# Patient Record
Sex: FEMALE | Race: WHITE | NOT HISPANIC OR LATINO | URBAN - METROPOLITAN AREA
[De-identification: names, ages, dates, MRNs, and addresses within clinical notes are randomized per-mention and may not be internally consistent; named-entity substitution may affect disease eponyms.]

---

## 2018-04-03 ENCOUNTER — AMBULATORY - HEALTHEAST (OUTPATIENT)
Dept: MULTI SPECIALTY CLINIC | Facility: CLINIC | Age: 28
End: 2018-04-03

## 2018-04-03 LAB — PAP SMEAR - HIM PATIENT REPORTED: NORMAL

## 2018-11-21 ENCOUNTER — COMMUNICATION - HEALTHEAST (OUTPATIENT)
Dept: TELEHEALTH | Facility: CLINIC | Age: 28
End: 2018-11-21

## 2018-11-21 ENCOUNTER — OFFICE VISIT - HEALTHEAST (OUTPATIENT)
Dept: INTERNAL MEDICINE | Facility: CLINIC | Age: 28
End: 2018-11-21

## 2018-11-21 DIAGNOSIS — E61.1 IRON DEFICIENCY: ICD-10-CM

## 2018-11-21 DIAGNOSIS — G47.11 IDIOPATHIC HYPERSOMNIA: ICD-10-CM

## 2018-11-21 DIAGNOSIS — R76.8 ANTI-TPO ANTIBODIES PRESENT: ICD-10-CM

## 2018-11-21 DIAGNOSIS — F41.9 ANXIETY: ICD-10-CM

## 2018-11-21 LAB
ERYTHROCYTE [DISTWIDTH] IN BLOOD BY AUTOMATED COUNT: 13.2 % (ref 11–14.5)
HCT VFR BLD AUTO: 41.1 % (ref 35–47)
HGB BLD-MCNC: 13.5 G/DL (ref 12–16)
MCH RBC QN AUTO: 28.8 PG (ref 27–34)
MCHC RBC AUTO-ENTMCNC: 32.8 G/DL (ref 32–36)
MCV RBC AUTO: 88 FL (ref 80–100)
PLATELET # BLD AUTO: 328 THOU/UL (ref 140–440)
PMV BLD AUTO: 9.7 FL (ref 8.5–12.5)
RBC # BLD AUTO: 4.69 MILL/UL (ref 3.8–5.4)
WBC: 5.3 THOU/UL (ref 4–11)

## 2018-11-21 ASSESSMENT — MIFFLIN-ST. JEOR: SCORE: 1333.23

## 2018-11-23 LAB
FERRITIN SERPL-MCNC: 14 NG/ML (ref 10–130)
TSH SERPL DL<=0.005 MIU/L-ACNC: 3.04 UIU/ML (ref 0.3–5)

## 2018-12-16 ENCOUNTER — COMMUNICATION - HEALTHEAST (OUTPATIENT)
Dept: INTERNAL MEDICINE | Facility: CLINIC | Age: 28
End: 2018-12-16

## 2018-12-26 ENCOUNTER — COMMUNICATION - HEALTHEAST (OUTPATIENT)
Dept: INTERNAL MEDICINE | Facility: CLINIC | Age: 28
End: 2018-12-26

## 2019-01-14 ENCOUNTER — COMMUNICATION - HEALTHEAST (OUTPATIENT)
Dept: INTERNAL MEDICINE | Facility: CLINIC | Age: 29
End: 2019-01-14

## 2019-01-22 ENCOUNTER — COMMUNICATION - HEALTHEAST (OUTPATIENT)
Dept: INTERNAL MEDICINE | Facility: CLINIC | Age: 29
End: 2019-01-22

## 2019-02-13 ENCOUNTER — COMMUNICATION - HEALTHEAST (OUTPATIENT)
Dept: INTERNAL MEDICINE | Facility: CLINIC | Age: 29
End: 2019-02-13

## 2019-03-19 ENCOUNTER — COMMUNICATION - HEALTHEAST (OUTPATIENT)
Dept: INTERNAL MEDICINE | Facility: CLINIC | Age: 29
End: 2019-03-19

## 2019-03-19 DIAGNOSIS — G47.11 IDIOPATHIC HYPERSOMNIA: ICD-10-CM

## 2019-04-24 ENCOUNTER — COMMUNICATION - HEALTHEAST (OUTPATIENT)
Dept: INTERNAL MEDICINE | Facility: CLINIC | Age: 29
End: 2019-04-24

## 2019-04-24 DIAGNOSIS — G47.11 IDIOPATHIC HYPERSOMNIA: ICD-10-CM

## 2019-06-01 ENCOUNTER — COMMUNICATION - HEALTHEAST (OUTPATIENT)
Dept: INTERNAL MEDICINE | Facility: CLINIC | Age: 29
End: 2019-06-01

## 2019-06-01 DIAGNOSIS — G47.11 IDIOPATHIC HYPERSOMNIA: ICD-10-CM

## 2019-06-19 ENCOUNTER — RECORDS - HEALTHEAST (OUTPATIENT)
Dept: ADMINISTRATIVE | Facility: OTHER | Age: 29
End: 2019-06-19

## 2019-07-09 ENCOUNTER — COMMUNICATION - HEALTHEAST (OUTPATIENT)
Dept: INTERNAL MEDICINE | Facility: CLINIC | Age: 29
End: 2019-07-09

## 2019-07-09 DIAGNOSIS — G47.11 IDIOPATHIC HYPERSOMNIA: ICD-10-CM

## 2019-08-08 ENCOUNTER — COMMUNICATION - HEALTHEAST (OUTPATIENT)
Dept: INTERNAL MEDICINE | Facility: CLINIC | Age: 29
End: 2019-08-08

## 2019-08-08 DIAGNOSIS — G47.11 IDIOPATHIC HYPERSOMNIA: ICD-10-CM

## 2019-08-14 ENCOUNTER — OFFICE VISIT - HEALTHEAST (OUTPATIENT)
Dept: INTERNAL MEDICINE | Facility: CLINIC | Age: 29
End: 2019-08-14

## 2019-08-14 DIAGNOSIS — M77.9 BONE SPUR: ICD-10-CM

## 2019-08-14 DIAGNOSIS — Z01.818 PREOPERATIVE EXAMINATION: ICD-10-CM

## 2019-08-14 DIAGNOSIS — M79.672 LEFT FOOT PAIN: ICD-10-CM

## 2019-08-14 DIAGNOSIS — G47.11 IDIOPATHIC HYPERSOMNIA: ICD-10-CM

## 2019-08-14 ASSESSMENT — MIFFLIN-ST. JEOR: SCORE: 1328.7

## 2019-08-23 ENCOUNTER — RECORDS - HEALTHEAST (OUTPATIENT)
Dept: ADMINISTRATIVE | Facility: OTHER | Age: 29
End: 2019-08-23

## 2019-08-28 ENCOUNTER — RECORDS - HEALTHEAST (OUTPATIENT)
Dept: ADMINISTRATIVE | Facility: OTHER | Age: 29
End: 2019-08-28

## 2019-08-28 LAB
LAB AP CHARGES (HE HISTORICAL CONVERSION): NORMAL
PATH REPORT.COMMENTS IMP SPEC: NORMAL
PATH REPORT.COMMENTS IMP SPEC: NORMAL
PATH REPORT.FINAL DX SPEC: NORMAL
PATH REPORT.GROSS SPEC: NORMAL
PATH REPORT.MICROSCOPIC SPEC OTHER STN: NORMAL
PATH REPORT.RELEVANT HX SPEC: NORMAL
RESULT FLAG (HE HISTORICAL CONVERSION): NORMAL

## 2019-09-10 ENCOUNTER — COMMUNICATION - HEALTHEAST (OUTPATIENT)
Dept: INTERNAL MEDICINE | Facility: CLINIC | Age: 29
End: 2019-09-10

## 2019-09-11 ENCOUNTER — COMMUNICATION - HEALTHEAST (OUTPATIENT)
Dept: INTERNAL MEDICINE | Facility: CLINIC | Age: 29
End: 2019-09-11

## 2019-09-11 DIAGNOSIS — G47.11 IDIOPATHIC HYPERSOMNIA: ICD-10-CM

## 2019-10-20 ENCOUNTER — COMMUNICATION - HEALTHEAST (OUTPATIENT)
Dept: INTERNAL MEDICINE | Facility: CLINIC | Age: 29
End: 2019-10-20

## 2019-10-20 DIAGNOSIS — G47.11 IDIOPATHIC HYPERSOMNIA: ICD-10-CM

## 2019-11-26 ENCOUNTER — COMMUNICATION - HEALTHEAST (OUTPATIENT)
Dept: INTERNAL MEDICINE | Facility: CLINIC | Age: 29
End: 2019-11-26

## 2019-11-26 DIAGNOSIS — Z00.00 ROUTINE GENERAL MEDICAL EXAMINATION AT A HEALTH CARE FACILITY: ICD-10-CM

## 2019-11-29 RX ORDER — DROSPIRENONE AND ETHINYL ESTRADIOL 0.03MG-3MG
KIT ORAL
Qty: 84 TABLET | Refills: 3 | Status: SHIPPED | OUTPATIENT
Start: 2019-11-29

## 2019-12-01 ENCOUNTER — COMMUNICATION - HEALTHEAST (OUTPATIENT)
Dept: INTERNAL MEDICINE | Facility: CLINIC | Age: 29
End: 2019-12-01

## 2019-12-01 DIAGNOSIS — G47.11 IDIOPATHIC HYPERSOMNIA: ICD-10-CM

## 2020-01-06 ENCOUNTER — COMMUNICATION - HEALTHEAST (OUTPATIENT)
Dept: INTERNAL MEDICINE | Facility: CLINIC | Age: 30
End: 2020-01-06

## 2020-01-06 DIAGNOSIS — G47.11 IDIOPATHIC HYPERSOMNIA: ICD-10-CM

## 2020-01-08 ENCOUNTER — COMMUNICATION - HEALTHEAST (OUTPATIENT)
Dept: INTERNAL MEDICINE | Facility: CLINIC | Age: 30
End: 2020-01-08

## 2020-01-08 DIAGNOSIS — G47.11 IDIOPATHIC HYPERSOMNIA: ICD-10-CM

## 2020-02-11 ENCOUNTER — COMMUNICATION - HEALTHEAST (OUTPATIENT)
Dept: INTERNAL MEDICINE | Facility: CLINIC | Age: 30
End: 2020-02-11

## 2020-02-11 DIAGNOSIS — G47.11 IDIOPATHIC HYPERSOMNIA: ICD-10-CM

## 2020-02-12 ENCOUNTER — COMMUNICATION - HEALTHEAST (OUTPATIENT)
Dept: INTERNAL MEDICINE | Facility: CLINIC | Age: 30
End: 2020-02-12

## 2020-02-12 DIAGNOSIS — G47.11 IDIOPATHIC HYPERSOMNIA: ICD-10-CM

## 2020-02-14 ENCOUNTER — COMMUNICATION - HEALTHEAST (OUTPATIENT)
Dept: INTERNAL MEDICINE | Facility: CLINIC | Age: 30
End: 2020-02-14

## 2020-02-14 DIAGNOSIS — G47.11 IDIOPATHIC HYPERSOMNIA: ICD-10-CM

## 2020-02-19 ENCOUNTER — COMMUNICATION - HEALTHEAST (OUTPATIENT)
Dept: INTERNAL MEDICINE | Facility: CLINIC | Age: 30
End: 2020-02-19

## 2020-02-19 DIAGNOSIS — F41.9 ANXIETY: ICD-10-CM

## 2020-02-24 RX ORDER — VENLAFAXINE HYDROCHLORIDE 150 MG/1
CAPSULE, EXTENDED RELEASE ORAL
Qty: 90 CAPSULE | Refills: 3 | Status: SHIPPED | OUTPATIENT
Start: 2020-02-24

## 2020-03-20 ENCOUNTER — COMMUNICATION - HEALTHEAST (OUTPATIENT)
Dept: INTERNAL MEDICINE | Facility: CLINIC | Age: 30
End: 2020-03-20

## 2020-03-20 DIAGNOSIS — G47.11 IDIOPATHIC HYPERSOMNIA: ICD-10-CM

## 2020-03-21 ENCOUNTER — COMMUNICATION - HEALTHEAST (OUTPATIENT)
Dept: INTERNAL MEDICINE | Facility: CLINIC | Age: 30
End: 2020-03-21

## 2020-03-21 DIAGNOSIS — G47.11 IDIOPATHIC HYPERSOMNIA: ICD-10-CM

## 2020-03-23 RX ORDER — DEXTROAMPHETAMINE SACCHARATE, AMPHETAMINE ASPARTATE, DEXTROAMPHETAMINE SULFATE AND AMPHETAMINE SULFATE 1.25; 1.25; 1.25; 1.25 MG/1; MG/1; MG/1; MG/1
TABLET ORAL
Qty: 90 TABLET | Refills: 0 | Status: SHIPPED | OUTPATIENT
Start: 2020-03-23

## 2020-04-20 ENCOUNTER — COMMUNICATION - HEALTHEAST (OUTPATIENT)
Dept: INTERNAL MEDICINE | Facility: CLINIC | Age: 30
End: 2020-04-20

## 2020-04-20 DIAGNOSIS — G47.11 IDIOPATHIC HYPERSOMNIA: ICD-10-CM

## 2020-05-23 ENCOUNTER — COMMUNICATION - HEALTHEAST (OUTPATIENT)
Dept: INTERNAL MEDICINE | Facility: CLINIC | Age: 30
End: 2020-05-23

## 2020-05-23 DIAGNOSIS — G47.11 IDIOPATHIC HYPERSOMNIA: ICD-10-CM

## 2020-05-26 RX ORDER — DEXTROAMPHETAMINE SACCHARATE, AMPHETAMINE ASPARTATE, DEXTROAMPHETAMINE SULFATE AND AMPHETAMINE SULFATE 1.25; 1.25; 1.25; 1.25 MG/1; MG/1; MG/1; MG/1
TABLET ORAL
Qty: 90 TABLET | Refills: 0 | Status: SHIPPED | OUTPATIENT
Start: 2020-05-26

## 2020-07-06 ENCOUNTER — COMMUNICATION - HEALTHEAST (OUTPATIENT)
Dept: INTERNAL MEDICINE | Facility: CLINIC | Age: 30
End: 2020-07-06

## 2021-05-28 NOTE — TELEPHONE ENCOUNTER
Prescription Monitoring Program activity reviewed with no discrepancies noted.      Last fill per : 03/21/2019  Quantity/days supply: 90    Controlled Substance Agreement on file: Yes  Date: 11/21/2018    Last office visit with provider:  11/21/2018 Guicho Kilgore MD    Please advise.

## 2021-05-28 NOTE — TELEPHONE ENCOUNTER
Controlled Substance Refill Request  Medication:   Requested Prescriptions     Pending Prescriptions Disp Refills     dextroamphetamine-amphetamine (ADDERALL) 5 mg Tab tablet 90 tablet 0     Sig: Adderall 5 mg tablet  2 tablets in am and 1 tablet in pm     Date Last Fill: 3/21/19  Pharmacy: [id number]   Submit electronically to pharmacy  Controlled Substance Agreement on File:   Encounter-Level CSA Scan Date:    There are no encounter-level csa scan date.       Last office visit: Last office visit pertaining to requested medication was 11/21/18.

## 2021-05-29 NOTE — TELEPHONE ENCOUNTER
Controlled Substance Refill Request  Medication:   Requested Prescriptions     Pending Prescriptions Disp Refills     dextroamphetamine-amphetamine (ADDERALL) 5 mg Tab tablet 90 tablet 0     Sig: Adderall 5 mg tablet  2 tablets in am and 1 tablet in pm     Date Last Fill: 4/26/19  [facility]    Submit electronically to pharmacy  Controlled Substance Agreement on File:   Encounter-Level CSA Scan Date:    There are no encounter-level csa scan date.       Last office visit: Last office visit pertaining to requested medication was 11/21/18 .

## 2021-05-30 NOTE — TELEPHONE ENCOUNTER
Controlled Substance Refill Request  Medication:   Requested Prescriptions     Pending Prescriptions Disp Refills     dextroamphetamine-amphetamine (ADDERALL) 5 mg Tab tablet 90 tablet 0     Sig: Adderall 5 mg tablet  2 tablets in am and 1 tablet in pm     Date Last Fill: 6/4/19  Pharmacy: [id number]   Submit electronically to pharmacy  Controlled Substance Agreement on File:   Encounter-Level CSA Scan Date:    There are no encounter-level csa scan date.       Last office visit: Last office visit pertaining to requested medication was 11/21/18.

## 2021-05-31 NOTE — PROGRESS NOTES
Preoperative Consultation   Alexa Craig   28 y.o.  female    Date of visit: 8/14/2019  Physician: Guicho Kilgore MD    This is a preoperative consultation requested by Dr. Mosley in preparation for left foot surgery on 8/23/19 at [facility].       Assessment and Plan   Alexa Craig was seen in preoperative consultation in preparation for left foot sugery.  This is a low risk surgery and the patient has no increased risk for major cardiac complications.  Please note she will hold ibuprofen 1 week prior to surgery.  Her last menstrual period started on 8/7/2019 and urine pregnancy test could be performed the morning of surgery.    1. Preoperative examination    2. Left foot pain    3. Bone spur    4. Idiopathic hypersomnia         Patient Profile   Social History     Social History Narrative    Lives with her Luigi parmarian.  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          Past Medical History   Patient Active Problem List   Diagnosis     Idiopathic hypersomnia       Past Surgical History  She has a past surgical history that includes Toe Surgery (Right).     History of Present Illness   This 28 y.o. old woman comes in for preoperative evaluation.  She will be having debridement of her left foot for chronic left foot pain and edema.  She has tried conservative measures which have not been helpful.  She had similar symptoms of her right foot and had surgery which was helpful.  She has pain with daily activities and has not been able to do much cardiovascular exercise.    Recent antiplatelet use: yes ibuprofen, will stop 1 week prior to surgery  Personal or family history of bleeding or clotting disorders: no  Steroid use in the past year: no  Personal or family history of difficulty with anesthesia: no  Current cardiopulmonary symptoms: no  Last Menstrual Period: 8/7/19    Review of Systems: A comprehensive review of systems was negative except as noted.     Medications and Allergies  "  Current Outpatient Medications   Medication Sig Dispense Refill     dextroamphetamine-amphetamine (ADDERALL) 5 mg Tab tablet Adderall 5 mg tablet  2 tablets in am and 1 tablet in pm 90 tablet 0     drospirenone-ethinyl estradiol (AIME) 3-0.03 mg per tablet Take 1 tablet by mouth daily. 3 Package 3     venlafaxine (EFFEXOR-XR) 150 MG 24 hr capsule Take 1 capsule (150 mg total) by mouth daily. 90 capsule 3     No current facility-administered medications for this visit.      No Known Allergies     Family and Social History   Family History   Problem Relation Age of Onset     Osteoporosis Mother      Hyperlipidemia Father      No Medical Problems Sister      No Medical Problems Sister      No Medical Problems Sister         Social History     Tobacco Use     Smoking status: Never Smoker     Smokeless tobacco: Never Used   Substance Use Topics     Alcohol use: Yes     Frequency: 2-4 times a month     Drinks per session: 3 or 4     Drug use: No        Physical Exam   General Appearance:   No acute distress    /68 (Patient Site: Left Arm, Patient Position: Sitting, Cuff Size: Adult Regular)   Pulse 79   Ht 5' 7\" (1.702 m)   Wt 127 lb (57.6 kg)   SpO2 99%   BMI 19.89 kg/m      EYES: Eyelids, conjunctiva, and sclera were normal. Pupils were normal. Cornea, iris, and lens were normal bilaterally.  HEAD, EARS, NOSE, MOUTH, AND THROAT: Head and face were normal. Hearing was normal to voice and the ears were normal to external exam. Nose appearance was normal and there was no discharge. Oropharynx was normal.  NECK: Neck appearance was normal. There were no neck masses and the thyroid was not enlarged.  RESPIRATORY: Breathing pattern was normal and the chest moved symmetrically.  Percussion/auscultatory percussion was normal.  Lung sounds were normal and there were no abnormal sounds.  CARDIOVASCULAR: Heart rate and rhythm were normal.  S1 and S2 were normal and there were no extra sounds or murmurs. Peripheral " pulses in arms and legs were normal.  Jugular venous pressure was normal.  There was no peripheral edema.  GASTROINTESTINAL: The abdomen was normal in contour.  Bowel sounds were present.  Percussion detected no organ enlargement or tenderness.  Palpation detected no tenderness, mass, or enlarged organs.   MUSCULOSKELETAL: Skeletal configuration was normal and muscle mass was normal for age. Joint appearance was overall normal.  LYMPHATIC: There were no enlarged nodes.  SKIN/HAIR/NAILS: Skin color was normal.  There were no skin lesions.  Hair and nails were normal.  NEUROLOGIC: The patient was alert and oriented to person, place, time, and circumstance. Speech was normal. Cranial nerves were normal. Motor strength was normal for age. The patient was normally coordinated.  PSYCHIATRIC:  Mood and affect were normal and the patient had normal recent and remote memory. The patient's judgment and insight were normal.       Additional Tests   Laboratory: Not indicated    Radiology: Reviewed MRI and surgical consultative notes    Electrocardiogram: Not indicated       Guicho Kilgore MD  Internal Medicine  Contact me at [phone]

## 2021-05-31 NOTE — TELEPHONE ENCOUNTER
Controlled Substance Refill Request  Medication:   Requested Prescriptions     Pending Prescriptions Disp Refills     dextroamphetamine-amphetamine (ADDERALL) 5 mg Tab tablet 90 tablet 0     Sig: Adderall 5 mg tablet  2 tablets in am and 1 tablet in pm     Date Last Fill: 7/10/19  [id number]   Submit electronically to pharmacy  Controlled Substance Agreement on File:   Encounter-Level CSA Scan Date:    There are no encounter-level csa scan date.       Last office visit: 11/21/2018 Guicho Kilgore MD

## 2021-06-02 VITALS — WEIGHT: 128 LBS | BODY MASS INDEX: 20.09 KG/M2 | HEIGHT: 67 IN

## 2021-06-02 NOTE — TELEPHONE ENCOUNTER
Prescription Monitoring Program activity reviewed with no discrepancies noted.      Last fill per : 9/11/19  Quantity/days supply: 90 tablets for 30 days    Controlled Substance Agreement on file: Yes  Date: 11/28/18    Last office visit with provider:  11/21/2018 Guicho Kilgore MD    Please advise.

## 2021-06-02 NOTE — TELEPHONE ENCOUNTER
Controlled Substance Refill Request  Medication:   Requested Prescriptions     Pending Prescriptions Disp Refills     dextroamphetamine-amphetamine (ADDERALL) 5 mg Tab tablet 90 tablet 0     Sig: Adderall 5 mg tablet  2 tablets in am and 1 tablet in pm     Date Last Fill: 9/11/19  [facility]   Submit electronically to pharmacy  Controlled Substance Agreement on File:   Encounter-Level CSA Scan Date:    There are no encounter-level csa scan date.       Last office visit: Last office visit pertaining to requested medication was 8/14/19.

## 2021-06-03 VITALS — HEIGHT: 67 IN | WEIGHT: 127 LBS | BODY MASS INDEX: 19.93 KG/M2

## 2021-06-03 NOTE — TELEPHONE ENCOUNTER
RN cannot approve Refill Request    RN can NOT refill this medication medication not on med list.     Last office visit: 11/21/2018 uGicho Kilgore MD Last Physical: 8/14/2019 Last MTM visit: Visit date not found Last visit same specialty: 11/21/2018 Guicho Kilgore MD.  Next visit within 3 mo: Visit date not found  Next physical within 3 mo: Visit date not found      Vinod Messina, [facility] Triage/Med Refill 11/28/2019    Requested Prescriptions   Pending Prescriptions Disp Refills     GERMAIN 3-0.03 mg per tablet [Pharmacy Med Name: GERMAIN 3-0.03MG TABLETS 28S] 84 tablet 0     Sig: TAKE 1 TABLET BY MOUTH DAILY       Oral Contraceptives Protocol Passed - 11/26/2019  3:50 AM        Passed - Visit with PCP or prescribing provider visit in last 12 months      Last office visit with prescriber/PCP: 11/21/2018 Guicho Kilgore MD OR same dept: Visit date not found OR same specialty: 11/21/2018 Guicho Kilgore MD  Last physical: 8/14/2019 Last MTM visit: Visit date not found   Next visit within 3 mo: Visit date not found  Next physical within 3 mo: Visit date not found  Prescriber OR PCP: Guicho Kilgore MD  Last diagnosis associated with med order: There are no diagnoses linked to this encounter.  If protocol passes may refill for 12 months if within 3 months of last provider visit (or a total of 15 months).

## 2021-06-03 NOTE — TELEPHONE ENCOUNTER
Controlled Substance Refill Request  Medication:   Requested Prescriptions     Pending Prescriptions Disp Refills     dextroamphetamine-amphetamine (ADDERALL) 5 mg Tab tablet 90 tablet 0     Sig: Adderall 5 mg tablet  2 tablets in am and 1 tablet in pm     Date Last Fill: 10/21/2019  Pharmacy: [facility]   Submit electronically to pharmacy  Controlled Substance Agreement on File:   Encounter-Level CSA Scan Date:    There are no encounter-level csa scan date.       Last office visit: 8/14/2019. Last office visit pertaining to requested medication was 11/21/2018 with PCP Dr SVETLANA Kilgore, establishing care.

## 2021-06-03 NOTE — TELEPHONE ENCOUNTER
Prescription Monitoring Program activity reviewed with no discrepancies noted.      Last fill per : 10/21/19  Quantity/days supply: 90 tablets for 30 days    Controlled Substance Agreement on file: No  Date: NA    Last office visit with provider:  8/14/19  Please advise.

## 2021-06-05 NOTE — TELEPHONE ENCOUNTER
Controlled Substance Refill Request  Medication Name:   Requested Prescriptions     Pending Prescriptions Disp Refills     dextroamphetamine-amphetamine (ADDERALL) 5 mg Tab tablet 90 tablet 0     Sig: Adderall 5 mg tablet  2 tablets in am and 1 tablet in pm     Date Last Fill:   dextroamphetamine-amphetamine (ADDERALL) 5 mg Tab tablet 90 tablet 0 12/2/2019  No   Sig: Adderall 5 mg tablet  2 tablets in am and 1 tablet in pm   Sent to pharmacy as: dextroamphetamine-amphetamine 5 mg tablet (ADDERALL)   Earliest Fill Date: 12/2/2019   E-Prescribing Status: Receipt confirmed by pharmacy (12/2/2019  9:17 AM CST)   [facility]ed [facility]  Submit electronically to pharmacy  Controlled Substance Agreement on file:   Encounter-Level CSA Scan Date - 11/21/2018:    Scan on 11/29/2018  6:39 AM        Last office visit:  8/14/19

## 2021-06-05 NOTE — TELEPHONE ENCOUNTER
Controlled Substance Refill Request  Medication Name:   Requested Prescriptions     Pending Prescriptions Disp Refills     dextroamphetamine-amphetamine (ADDERALL) 5 mg Tab tablet 90 tablet 0     Sig: Adderall 5 mg tablet  2 tablets in am and 1 tablet in pm     Date Last Fill:   dextroamphetamine-amphetamine (ADDERALL) 5 mg Tab tablet 90 tablet 0 12/2/2019  No   Sig: Adderall 5 mg tablet  2 tablets in am and 1 tablet in pm   Sent to pharmacy as: dextroamphetamine-amphetamine 5 mg tablet (ADDERALL)   Earliest Fill Date: 12/2/2019   [facility]ed [facility]  Submit electronically to pharmacy  Controlled Substance Agreement on file:   Encounter-Level CSA Scan Date - 11/21/2018:    Scan on 11/29/2018  6:39 AM        Last office visit:  8/14/19

## 2021-06-06 NOTE — TELEPHONE ENCOUNTER
RN cannot approve Refill Request    RN can NOT refill this medication Protocol failed and NO refill given.      Anitra Godwin, [facility] Triage/Med Refill 2/21/2020    Requested Prescriptions   Pending Prescriptions Disp Refills     venlafaxine (EFFEXOR-XR) 150 MG 24 hr capsule [Pharmacy Med Name: VENLAFAXINE ER 150MG CAPSULES] 90 capsule 3     Sig: TAKE 1 CAPSULE(150 MG) BY MOUTH DAILY       Venlafaxine/Desvenlafaxine Refill Protocol Failed - 2/19/2020  3:47 AM        Failed - LFT or AST or ALT in last year     No results found for: ALBUMIN, BILITOT, BILIDIR, ALKPHOS, AST, ALT, PROT             Failed - Fasting lipid cascade in last year     No results found for: CHOL, TRIG, HDL, LDLCALC, FASTING          Passed - PCP or prescribing provider visit in last year     Last office visit with prescriber/PCP: 11/21/2018 Guicho Kilgore MD OR same dept: Visit date not found OR same specialty: 11/21/2018 Guicho Kilgore MD  Last physical: 8/14/2019 Last MTM visit: Visit date not found   Next visit within 3 mo: Visit date not found  Next physical within 3 mo: Visit date not found  Prescriber OR PCP: Guicho Kilgore MD  Last diagnosis associated with med order: There are no diagnoses linked to this encounter.  If protocol passes may refill for 12 months if within 3 months of last provider visit (or a total of 15 months).             Passed - Blood Pressure in last year     BP Readings from Last 1 Encounters:   08/14/19 104/68

## 2021-06-07 NOTE — TELEPHONE ENCOUNTER
Controlled Substance Refill Request  Medication Name:   Requested Prescriptions     Pending Prescriptions Disp Refills     dextroamphetamine-amphetamine (ADDERALL) 5 mg Tab tablet 90 tablet 0     Sig: Adderall 5 mg tablet  2 tablets in am and 1 tablet in pm     Date Last Fill: 3/23/20  [facility]  Submit electronically to pharmacy  Controlled Substance Agreement on file:   Encounter-Level CSA Scan Date - 11/21/2018:    Scan on 11/29/2018  6:39 AM        Last office visit:  8/14/19

## 2021-06-07 NOTE — TELEPHONE ENCOUNTER
Controlled Substance Refill Request  Medication Name:   Requested Prescriptions     Pending Prescriptions Disp Refills     dextroamphetamine-amphetamine (ADDERALL) 5 mg Tab tablet 90 tablet 0     Sig: Adderall 5 mg tablet  2 tablets in am and 1 tablet in pm     Date Last Fill: 2/14/20  [facility]  Submit electronically to pharmacy  Controlled Substance Agreement on file:   Encounter-Level CSA Scan Date - 11/21/2018:    Scan on 11/29/2018  6:39 AM        Last office visit:  8/14/19

## 2021-06-08 NOTE — TELEPHONE ENCOUNTER
Controlled Substance Refill Request  Medication Name:   Requested Prescriptions     Pending Prescriptions Disp Refills     dextroamphetamine-amphetamine (ADDERALL) 5 mg Tab tablet 90 tablet 0     Sig: Adderall 5 mg tablet  2 tablets in am and 1 tablet in pm     Date Last Fill: 4/21/20  [facility]  Submit electronically to pharmacy  Controlled Substance Agreement on file:   Encounter-Level CSA Scan Date - 11/21/2018:    Scan on 11/29/2018  6:39 AM        Last office visit:  8/14/19

## 2021-06-16 PROBLEM — G47.11 IDIOPATHIC HYPERSOMNIA: Status: ACTIVE | Noted: 2018-11-21

## 2021-06-23 NOTE — TELEPHONE ENCOUNTER
Prescription Monitoring Program activity reviewed with no discrepancies noted.  Last fill per : 12/22/18 90 tabs 30 day supply    Controlled Substance Agreement on file: Yes  Date: 11/21/18    Last office visit with provider:  11/21/2018 Guicho Kilgore MD    Please advise.    Magui ABAD RN Clinical Supervisor...................9:57 AM

## 2021-06-23 NOTE — TELEPHONE ENCOUNTER
Controlled Substance Refill Request  Medication:   Requested Prescriptions     Pending Prescriptions Disp Refills     dextroamphetamine-amphetamine (ADDERALL) 5 mg Tab tablet 90 tablet 0     Sig: Adderall 5 mg tablet  2 tablets in am and 1 tablet in pm     Date Last Fill: 12/18/18  [facility]   Submit electronically to pharmacy  Controlled Substance Agreement on File:   Encounter-Level CSA Scan Date:    There are no encounter-level csa scan date.       Last office visit: Last office visit pertaining to requested medication was 11/21/18 .

## 2021-06-23 NOTE — TELEPHONE ENCOUNTER
RN cannot approve Refill Request    RN can NOT refill this medication overdue for office visits and/or labs.    Tunde Montana, [facility] Triage/Med Refill 1/24/2019    Requested Prescriptions   Pending Prescriptions Disp Refills     venlafaxine (EFFEXOR-XR) 150 MG 24 hr capsule [Pharmacy Med Name: VENLAFAXINE ER 150MG CAPSULES] 30 capsule 0     Sig: TAKE 1 CAPSULE(150 MG) BY MOUTH DAILY    Venlafaxine/Desvenlafaxine Refill Protocol Failed - 1/22/2019  2:03 PM       Failed - LFT or AST or ALT in last year    No results found for: ALBUMIN, BILITOT, BILIDIR, ALKPHOS, AST, ALT, PROT            Failed - Fasting lipid cascade in last year    No results found for: CHOL, TRIG, HDL, LDLCALC, FASTING         Passed - PCP or prescribing provider visit in last year    Last office visit with prescriber/PCP: Visit date not found OR same dept: 11/21/2018 Guicho Kilgore MD OR same specialty: 11/21/2018 Guicho Kilgore MD  Last physical: Visit date not found Last MTM visit: Visit date not found   Next visit within 3 mo: Visit date not found  Next physical within 3 mo: Visit date not found  Prescriber OR PCP: Jean Krause MD  Last diagnosis associated with med order: There are no diagnoses linked to this encounter.  If protocol passes may refill for 12 months if within 3 months of last provider visit (or a total of 15 months).            Passed - Blood Pressure in last year    BP Readings from Last 1 Encounters:   11/21/18 110/68

## 2021-06-24 NOTE — TELEPHONE ENCOUNTER
Controlled Substance Refill Request  Medication:   Requested Prescriptions     Pending Prescriptions Disp Refills     dextroamphetamine-amphetamine (ADDERALL) 5 mg Tab tablet 90 tablet 0     Sig: Adderall 5 mg tablet  2 tablets in am and 1 tablet in pm     venlafaxine (EFFEXOR-XR) 150 MG 24 hr capsule 30 capsule 0     Date Last Fill: 1/15/19  Pharmacy: [id number]   Submit electronically to pharmacy  Controlled Substance Agreement on File:   Encounter-Level CSA Scan Date:    There are no encounter-level csa scan date.       Last office visit: Last office visit pertaining to requested medication was 11/21/18.    Provider Refill Request  Medication:  venlafaxine  RN can NOT refill this medication per RN refill protocol because PCP messaged that patient is overdue for Labs     Anitra Godwin RN [facility] Triage/Medication Refill

## 2021-06-25 NOTE — TELEPHONE ENCOUNTER
Controlled Substance Refill Request  Medication:   Requested Prescriptions     Pending Prescriptions Disp Refills     dextroamphetamine-amphetamine (ADDERALL) 5 mg Tab tablet 90 tablet 0     Sig: Adderall 5 mg tablet  2 tablets in am and 1 tablet in pm     Date Last Fill: 2/14/19  [facility]   Submit electronically to pharmacy    Controlled Substance Agreement on File:   Encounter-Level CSA Scan Date:    There are no encounter-level csa scan date.       Last office visit with primary: 11/21/2018

## 2021-06-26 ENCOUNTER — HEALTH MAINTENANCE LETTER (OUTPATIENT)
Age: 31
End: 2021-06-26

## 2021-10-11 ENCOUNTER — HEALTH MAINTENANCE LETTER (OUTPATIENT)
Age: 31
End: 2021-10-11

## 2022-07-17 ENCOUNTER — HEALTH MAINTENANCE LETTER (OUTPATIENT)
Age: 32
End: 2022-07-17

## 2022-09-25 ENCOUNTER — HEALTH MAINTENANCE LETTER (OUTPATIENT)
Age: 32
End: 2022-09-25

## 2023-08-05 ENCOUNTER — HEALTH MAINTENANCE LETTER (OUTPATIENT)
Age: 33
End: 2023-08-05